# Patient Record
Sex: FEMALE | Race: WHITE | NOT HISPANIC OR LATINO | Employment: FULL TIME | URBAN - METROPOLITAN AREA
[De-identification: names, ages, dates, MRNs, and addresses within clinical notes are randomized per-mention and may not be internally consistent; named-entity substitution may affect disease eponyms.]

---

## 2019-02-19 ENCOUNTER — LAB REQUISITION (OUTPATIENT)
Dept: LAB | Facility: HOSPITAL | Age: 44
End: 2019-02-19

## 2019-02-19 ENCOUNTER — OUTSIDE FACILITY SERVICE (OUTPATIENT)
Dept: GASTROENTEROLOGY | Facility: CLINIC | Age: 44
End: 2019-02-19

## 2019-02-19 DIAGNOSIS — Z12.11 ENCOUNTER FOR SCREENING FOR MALIGNANT NEOPLASM OF COLON: ICD-10-CM

## 2019-02-19 DIAGNOSIS — R13.10 DYSPHAGIA: ICD-10-CM

## 2019-02-19 PROCEDURE — G0121 COLON CA SCRN NOT HI RSK IND: HCPCS | Performed by: INTERNAL MEDICINE

## 2019-02-19 PROCEDURE — 43249 ESOPH EGD DILATION <30 MM: CPT | Performed by: INTERNAL MEDICINE

## 2019-02-19 PROCEDURE — 43239 EGD BIOPSY SINGLE/MULTIPLE: CPT | Performed by: INTERNAL MEDICINE

## 2019-02-19 PROCEDURE — 88305 TISSUE EXAM BY PATHOLOGIST: CPT | Performed by: INTERNAL MEDICINE

## 2019-02-20 LAB
CYTO UR: NORMAL
LAB AP CASE REPORT: NORMAL
LAB AP CLINICAL INFORMATION: NORMAL
PATH REPORT.FINAL DX SPEC: NORMAL
PATH REPORT.GROSS SPEC: NORMAL

## 2019-05-16 ENCOUNTER — OFFICE VISIT (OUTPATIENT)
Dept: GASTROENTEROLOGY | Facility: CLINIC | Age: 44
End: 2019-05-16

## 2019-05-16 VITALS
HEIGHT: 62 IN | SYSTOLIC BLOOD PRESSURE: 115 MMHG | HEART RATE: 78 BPM | BODY MASS INDEX: 34.23 KG/M2 | DIASTOLIC BLOOD PRESSURE: 77 MMHG | WEIGHT: 186 LBS

## 2019-05-16 DIAGNOSIS — K21.9 GASTROESOPHAGEAL REFLUX DISEASE WITHOUT ESOPHAGITIS: ICD-10-CM

## 2019-05-16 DIAGNOSIS — R13.19 ESOPHAGEAL DYSPHAGIA: Primary | ICD-10-CM

## 2019-05-16 DIAGNOSIS — K82.8 DYSKINESIA OF GALLBLADDER: ICD-10-CM

## 2019-05-16 DIAGNOSIS — K31.84 GASTROPARESIS: ICD-10-CM

## 2019-05-16 PROCEDURE — 99214 OFFICE O/P EST MOD 30 MIN: CPT | Performed by: INTERNAL MEDICINE

## 2019-05-16 RX ORDER — METOPROLOL SUCCINATE 25 MG/1
TABLET, EXTENDED RELEASE ORAL
COMMUNITY
Start: 2019-02-12

## 2019-05-16 RX ORDER — ATORVASTATIN CALCIUM 40 MG/1
40 TABLET, FILM COATED ORAL DAILY
COMMUNITY

## 2019-05-16 RX ORDER — ASPIRIN 81 MG/1
81 TABLET ORAL DAILY
COMMUNITY

## 2019-05-16 RX ORDER — CLONAZEPAM 1 MG/1
TABLET ORAL
COMMUNITY
Start: 2019-02-12

## 2019-05-16 RX ORDER — CLINDAMYCIN HYDROCHLORIDE 300 MG/1
CAPSULE ORAL
COMMUNITY
Start: 2019-02-06

## 2019-05-16 NOTE — PROGRESS NOTES
GASTROENTEROLOGY OFFICE NOTE  Adrianne Hopkins  [id number]  1975    CARE TEAM  Patient Care Team:  Provider, No Known as PCP - General    No ref. provider found     Chief Complaint   Patient presents with   • Follow-up     EGD and colonoscopy for nausea, vomiting, dysphagia        HISTORY OF PRESENT ILLNESS:  Patient presents after nondiagnostic EGD and colonoscopy.    She continues to have pain it is mostly in the right upper quadrant is worse after fatty meals if intermittent is not occurring on a daily basis and can last for hours at a time.  It seems to radiate to her back.  She is wondering whether it has to do with her gallbladder.    Her colonoscopy at some areas limited prep and a repeat colonoscopy was recommended in 5 years.    She is not having problems currently with dysphagia to solids or odynophagia she denies early satiety or unexplained weight loss.    She has been having problems with dysphagia to solids but following her esophageal dilation 20 mm during her recent upper endoscopy, she has noted complete resolution of this complaint.    PAST MEDICAL HISTORY  Past Medical History:   Diagnosis Date   • Anxiety    • Atrial fibrillation (CMS/HCC)    • Bronchitis    • Depression    • Diabetes (CMS/HCC)    • GERD (gastroesophageal reflux disease)    • Unilateral agenesis of kidney         PAST SURGICAL HISTORY  Past Surgical History:   Procedure Laterality Date   • APPENDECTOMY     •  SECTION     • COLONOSCOPY     • ENDOSCOPY     • HYSTERECTOMY          MEDICATIONS:    Current Outpatient Medications:   •  aspirin 81 MG EC tablet, Take 81 mg by mouth Daily., Disp: , Rfl:   •  atorvastatin (LIPITOR) 40 MG tablet, Take 40 mg by mouth Daily., Disp: , Rfl:   •  Dulaglutide (TRULICITY SC), Inject  under the skin into the appropriate area as directed., Disp: , Rfl:   •  Lactobacillus (ACIDOPHILUS PO), Take  by mouth., Disp: , Rfl:   •  metFORMIN (GLUCOPHAGE) 1000 MG tablet, Take 1,000 mg by mouth  "2 (Two) Times a Day With Meals., Disp: , Rfl:   •  clindamycin (CLEOCIN) 300 MG capsule, , Disp: , Rfl:   •  clonazePAM (KlonoPIN) 1 MG tablet, , Disp: , Rfl:   •  metoprolol succinate XL (TOPROL-XL) 25 MG 24 hr tablet, , Disp: , Rfl:     ALLERGIES  No Known Allergies    FAMILY HISTORY:  Family History   Problem Relation Age of Onset   • Colon polyps Maternal Grandfather        SOCIAL HISTORY  Social History     Socioeconomic History   • Marital status: Single     Spouse name: Not on file   • Number of children: Not on file   • Years of education: Not on file   • Highest education level: Not on file   Tobacco Use   • Smoking status: Former Smoker   • Smokeless tobacco: Never Used   Substance and Sexual Activity   • Alcohol use: Yes     Comment: 3x a month    • Drug use: Yes     Types: Methamphetamines     Comment: former user. Quit 5 years ago    • Sexual activity: Defer     Socioeconomic History: Single.  Non-smoker/nondrinker.       REVIEW OF SYSTEMS  Review of Systems   Constitutional: Negative for unexpected weight loss.   HENT: Negative for trouble swallowing.    Eyes: Negative.    Respiratory: Negative.    Gastrointestinal: Positive for abdominal distention, abdominal pain, anal bleeding, constipation, diarrhea, nausea and vomiting. Negative for blood in stool, rectal pain, GERD and indigestion.   Endocrine: Negative.    Genitourinary: Negative.    Musculoskeletal: Negative.    Skin: Negative.    Allergic/Immunologic: Negative.    Neurological: Negative.    Hematological: Negative.    Psychiatric/Behavioral: Negative.      I reviewed the above ROS.  F2    PHYSICAL EXAM   /77 (BP Location: Right arm, Patient Position: Sitting, Cuff Size: Adult)   Pulse 78   Ht 157.5 cm (62\")   Wt 84.4 kg (186 lb)   BMI 34.02 kg/m²   General: Alert and oriented x 3. In no apparent or acute distress.  and No stigmata of chronic liver disease  HEENT: Anicteric slcera. Normal oropharynx  Neck: Supple. Without " lymphadenopathy  CV: Regular rate and rhythm, S1, S2  Lungs: Clear to ausculation. Without rales, robchi and wheezing  Abdomen:  Soft,non-distended without palpable masses or hepatosplenomeagaly, areas of rebound tenderness or guarding.   Extremeties: without clubbing, cyanosis or edema  Neurologic:  Alert and oriented x 3 without focal motor or sensory deficits  Rectal exam: deferred         Results Review:  I reviewed the patient's new clinical results.      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  It is mild and intermittent and I do not think a HIDA scan with CCK warranted if were not going to pursue a cholecystectomy and she agrees.  She will let me know if it gets worse at which time a HIDA scan with CCK would be reasonable  2.-Intermittent dysphagia resolved with esophageal dilation  3.-  Limited prior colonoscopy for which repeat screening is recommended in 5 years    PLAN  1.-  Consider HIDA scan with CCK for worsening/relapsing right upper quadrant abdominal pain  2.-  Repeat esophageal dilation as needed for recurrent dysphagia to solids  3.-  Dietary modification gastroparesis and suspected functional gallbladder disease is reviewed and certainly avoiding large meals, all avoiding fatty meals and converting to liquids only when symptoms exacerbate  4.-  Follow-up as needed      I discussed the patients findings and my recommendations with patient    Juanito Dodd MD  5/16/2019   1:21 PM    Much of this note is an electronic transcription of spoken language to printed text. Electronic transcription of spoken language may permit erroneous, nonsensical word phrases to be inadvertently transcribed.  Although I have reviewed the note for these errors, some may still be present.

## 2021-10-27 ENCOUNTER — TELEPHONE (OUTPATIENT)
Dept: GASTROENTEROLOGY | Facility: CLINIC | Age: 46
End: 2021-10-27

## 2021-10-27 NOTE — TELEPHONE ENCOUNTER
PATIENT CALLED IN STATING SHE WAS SEEN AT [facility] LAST NIGHT (4 AM, TODAY) AND THAT SHE FELT AS IF SHE WAS HAVING A HEART ATTACK. THE ER DOCTOR TOLD HER TO FOLLOW UP WITH US, SHE WOULD PROBABLY NEED ANOTHER DILATATION AND EGD PROCEDURE.     I HAVE CALLED [facility] AND REQUESTED HER RECORDS STAT VIA FAX TO SCHEDULE HER.     TRYING TO SCHEDULE HER FOR THIS Friday 10/29.

## 2021-11-03 ENCOUNTER — OUTSIDE FACILITY SERVICE (OUTPATIENT)
Dept: GASTROENTEROLOGY | Facility: CLINIC | Age: 46
End: 2021-11-03

## 2021-11-03 PROCEDURE — 43249 ESOPH EGD DILATION <30 MM: CPT | Performed by: INTERNAL MEDICINE

## 2021-11-03 PROCEDURE — 43239 EGD BIOPSY SINGLE/MULTIPLE: CPT | Performed by: INTERNAL MEDICINE

## 2021-11-08 ENCOUNTER — PREP FOR SURGERY (OUTPATIENT)
Dept: OTHER | Facility: HOSPITAL | Age: 46
End: 2021-11-08

## 2021-11-08 ENCOUNTER — TELEPHONE (OUTPATIENT)
Dept: GASTROENTEROLOGY | Facility: CLINIC | Age: 46
End: 2021-11-08

## 2021-12-14 ENCOUNTER — TELEPHONE (OUTPATIENT)
Dept: GASTROENTEROLOGY | Facility: CLINIC | Age: 46
End: 2021-12-14

## 2021-12-14 NOTE — TELEPHONE ENCOUNTER
Called patient to try to move her procedure time tomorrow down to arrive @ 7:00 AM instead of 6AM.     There was no answer, and no vcm is set up- leaving procedure time as is, right now until I speak with her.

## 2021-12-15 ENCOUNTER — OUTSIDE FACILITY SERVICE (OUTPATIENT)
Dept: GASTROENTEROLOGY | Facility: CLINIC | Age: 46
End: 2021-12-15

## 2021-12-15 PROCEDURE — 43239 EGD BIOPSY SINGLE/MULTIPLE: CPT | Performed by: INTERNAL MEDICINE

## 2022-06-14 ENCOUNTER — TELEPHONE (OUTPATIENT)
Dept: GASTROENTEROLOGY | Facility: CLINIC | Age: 47
End: 2022-06-14

## 2022-06-17 ENCOUNTER — TELEPHONE (OUTPATIENT)
Dept: GASTROENTEROLOGY | Facility: CLINIC | Age: 47
End: 2022-06-17

## 2022-06-17 ENCOUNTER — OFFICE VISIT (OUTPATIENT)
Dept: GASTROENTEROLOGY | Facility: CLINIC | Age: 47
End: 2022-06-17

## 2022-06-17 VITALS
TEMPERATURE: 97.3 F | HEIGHT: 62 IN | HEART RATE: 78 BPM | SYSTOLIC BLOOD PRESSURE: 163 MMHG | DIASTOLIC BLOOD PRESSURE: 88 MMHG | BODY MASS INDEX: 33.86 KG/M2 | WEIGHT: 184 LBS

## 2022-06-17 DIAGNOSIS — R14.0 ABDOMINAL DISTENTION: Primary | ICD-10-CM

## 2022-06-17 RX ORDER — FLUOXETINE HYDROCHLORIDE 20 MG/1
20 CAPSULE ORAL DAILY
COMMUNITY
Start: 2022-04-07

## 2022-06-17 RX ORDER — PROMETHAZINE HYDROCHLORIDE 25 MG/1
25 TABLET ORAL EVERY 6 HOURS PRN
COMMUNITY
Start: 2022-05-25

## 2022-06-17 RX ORDER — RIVAROXABAN 20 MG/1
20 TABLET, FILM COATED ORAL DAILY
COMMUNITY
Start: 2022-05-23

## 2022-06-17 RX ORDER — ONDANSETRON 4 MG/1
TABLET, ORALLY DISINTEGRATING ORAL
COMMUNITY
Start: 2022-05-27

## 2022-06-17 RX ORDER — OMEPRAZOLE 20 MG/1
20 CAPSULE, DELAYED RELEASE ORAL DAILY
COMMUNITY
Start: 2022-04-26

## 2022-06-17 RX ORDER — SEMAGLUTIDE 1.34 MG/ML
0.5 INJECTION, SOLUTION SUBCUTANEOUS WEEKLY
COMMUNITY
Start: 2022-06-04

## 2022-06-17 NOTE — PROGRESS NOTES
"GASTROENTEROLOGY OFFICE NOTE  Adrianne Hopkins  [id number]  1975    CARE TEAM  Patient Care Team:  Provider, No Known as PCP - Juanito Dickerson MD as Consulting Physician (Gastroenterology)        Chief Complaint   Patient presents with   • Abdominal Pain   • GI Problem   • Constipation        HISTORY OF PRESENT ILLNESS:  Patient is a 47-year-old female who presents today after a recent hospitalization to [facility].  She reports that on May 23 she began having nausea, vomiting and diarrhea that continued for 3 days before she was taken to the hospital in [place].  She was diagnosed with gastroenteritis and apparently was septic.  She reports she remained in ICU for 1 day and then transferred to the floor where she received IV antibiotics through her stay.    Today, she complains of abdominal distention and reports that she is only had 2 bowel movements since her discharge from the hospital and they have been very hard and difficult to pass, she has had to manually disimpact herself.    At this point of visit, I asked the patient more about her presenting complaints today trying to clarify that since she has been discharged from the hospital her problem is constipation and abdominal distention and that she is no longer sick from her acute illness of gastroenteritis.  She became angry and stated \"oh I am not sick, you think I'm not sick\" gathered her things, and began walking out of the exam room.  I attempted to continue to visit by explaining that it seems she's no longer sick from her acute illness I would like to know her symptoms since discharge from the hospital are but she left the office cursing.    PAST MEDICAL HISTORY  Past Medical History:   Diagnosis Date   • Anxiety    • Atrial fibrillation (HCC)    • Bronchitis    • Depression    • Diabetes (HCC)    • GERD (gastroesophageal reflux disease)    • Unilateral agenesis of kidney         PAST SURGICAL " HISTORY  Past Surgical History:   Procedure Laterality Date   • APPENDECTOMY     •  SECTION     • COLONOSCOPY     • ENDOSCOPY     • HYSTERECTOMY          MEDICATIONS:    Current Outpatient Medications:   •  FLUoxetine (PROzac) 20 MG capsule, Take 20 mg by mouth Daily., Disp: , Rfl:   •  metFORMIN (GLUCOPHAGE) 1000 MG tablet, Take 1,000 mg by mouth 2 (Two) Times a Day With Meals., Disp: , Rfl:   •  metoprolol succinate XL (TOPROL-XL) 25 MG 24 hr tablet, , Disp: , Rfl:   •  omeprazole (priLOSEC) 20 MG capsule, Take 20 mg by mouth Daily., Disp: , Rfl:   •  ondansetron ODT (ZOFRAN-ODT) 4 MG disintegrating tablet, DISSOLVE 1 TABLET IN MOUTH EVERY 6 HOURS AS NEEDED FOR NAUSEA AND VOMITING, Disp: , Rfl:   •  Ozempic, 0.25 or 0.5 MG/DOSE, 2 MG/1.5ML solution pen-injector, Inject 0.5 mg under the skin into the appropriate area as directed 1 (One) Time Per Week., Disp: , Rfl:   •  promethazine (PHENERGAN) 25 MG tablet, Take 25 mg by mouth Every 6 (Six) Hours As Needed., Disp: , Rfl:   •  Xarelto 20 MG tablet, Take 20 mg by mouth Daily., Disp: , Rfl:   •  aspirin 81 MG EC tablet, Take 81 mg by mouth Daily., Disp: , Rfl:   •  atorvastatin (LIPITOR) 40 MG tablet, Take 40 mg by mouth Daily., Disp: , Rfl:   •  clindamycin (CLEOCIN) 300 MG capsule, , Disp: , Rfl:   •  clonazePAM (KlonoPIN) 1 MG tablet, , Disp: , Rfl:   •  Dulaglutide (TRULICITY SC), Inject  under the skin into the appropriate area as directed., Disp: , Rfl:   •  Lactobacillus (ACIDOPHILUS PO), Take  by mouth., Disp: , Rfl:     ALLERGIES  Allergies   Allergen Reactions   • Bactrim [Sulfamethoxazole-Trimethoprim] Hives       FAMILY HISTORY:  Family History   Problem Relation Age of Onset   • Colon polyps Maternal Grandfather        SOCIAL HISTORY  Social History     Socioeconomic History   • Marital status: Single   Tobacco Use   • Smoking status: Former Smoker   • Smokeless tobacco: Never Used   Vaping Use   • Vaping Use: Never used   Substance and Sexual  "Activity   • Alcohol use: Not Currently     Comment: 3x a month    • Drug use: Yes     Types: Methamphetamines     Comment: former user. Quit 5 years ago    • Sexual activity: Defer         PHYSICAL EXAM   /88 (BP Location: Left arm, Patient Position: Sitting, Cuff Size: Adult)   Pulse 78   Temp 97.3 °F (36.3 °C) (Temporal)   Ht 157.5 cm (62\")   Wt 83.5 kg (184 lb)   BMI 33.65 kg/m²   Physical Exam  Patient left before exam performed    Results Review:  No records available for review    ASSESSMENT / PLAN  1. Abdominal distention  Patient left prior to full assessment or plan made      Terence Redd, RENATE                    "

## 2022-06-17 NOTE — TELEPHONE ENCOUNTER
"Patient left our office today during her visit with Terence Ivania upset and Yelled as she opened the door to leave \"That woman is a B**CH!\" Slinging the door open leaving a dent in the wall.    Patient left a voice mail on Dr. Dodd' MA's phone stating that She had just left our office and that Terence manning was a \"B**CH. That she wanted her records brought to Dr. Dodd attention because she \"WAS\" sick and her stomach hurt and she lost 13 lbs in the past 3weeks and had rectal bleeding.    I called Dr. Vick to let him know what happened he said to gather her information for him to review.      I saw there was not Primary care in the computer listed.     I called pt, introduced myself and asked who her PCP was (Aldair Norton MD in [place].) she wanted to know why do I care who her PCP is? I told her that I called Dr. Dodd to discuss care and her leaving quite the dent in the wall. he wanted all of the records including the PCP visit for her.   She said well [place] and [place] your wall but that lady does not need to be in healthcare. She told me I wasn't sick she asked why I was there!!   I said well we always ask why are you here today. That's so that we know what is going on with you Now.   She said this is ridiculous Just send me my records, I'll find someone else. Then ended the call.   -we will send records from Dr. Dodd.   " Detail Level: Zone Plan: Patient brought in a variety of different over the counter products that she’s been using daily\\nI educated the patient on what would be best to use while being able to save money. Samples Given: UV ELEMENTS SUNBLOCK Plan: Location: behind her left ear \\nPreviously prescribed: Verdeso foam top qd x 30 days and Olux E 0.05% foam prn\\n\\n\\nPatient presents today with irritation behind her left ear that she states has been present for a little over a week \\nShe’s states she’s not been using anything on the area of concern \\nDiscussed with the patient that she should resume using Olux-E topical foam until irritation and inflammation has improved and then continue using as needed. \\n\\nPatient discussed that she is also taking Allegra BID to help level out her antihistamine levels.\\nDiscussed with pt that even though her eczema is under control today, it may flare up again due seasonal changes or allergies.\\n\\nAdvised pt to use a good MC such s Cerave daily to help reestablish a healthy skin barrier.\\nF/u as needed